# Patient Record
(demographics unavailable — no encounter records)

---

## 2017-05-31 PROBLEM — L29.2 VULVAR ITCHING: Status: ACTIVE | Noted: 2017-05-31

## 2017-05-31 PROCEDURE — 87660 TRICHOMONAS VAGIN DIR PROBE: CPT | Performed by: OBSTETRICS & GYNECOLOGY

## 2017-05-31 PROCEDURE — 87624 HPV HI-RISK TYP POOLED RSLT: CPT | Performed by: OBSTETRICS & GYNECOLOGY

## 2017-05-31 PROCEDURE — 87389 HIV-1 AG W/HIV-1&-2 AB AG IA: CPT | Performed by: OBSTETRICS & GYNECOLOGY

## 2017-05-31 PROCEDURE — 86696 HERPES SIMPLEX TYPE 2 TEST: CPT | Performed by: OBSTETRICS & GYNECOLOGY

## 2017-05-31 PROCEDURE — 87480 CANDIDA DNA DIR PROBE: CPT | Performed by: OBSTETRICS & GYNECOLOGY

## 2017-05-31 PROCEDURE — 86803 HEPATITIS C AB TEST: CPT | Performed by: OBSTETRICS & GYNECOLOGY

## 2017-05-31 PROCEDURE — 87340 HEPATITIS B SURFACE AG IA: CPT | Performed by: OBSTETRICS & GYNECOLOGY

## 2017-05-31 PROCEDURE — 36415 COLL VENOUS BLD VENIPUNCTURE: CPT | Performed by: OBSTETRICS & GYNECOLOGY

## 2017-05-31 PROCEDURE — 87510 GARDNER VAG DNA DIR PROBE: CPT | Performed by: OBSTETRICS & GYNECOLOGY

## 2017-05-31 PROCEDURE — 87591 N.GONORRHOEAE DNA AMP PROB: CPT | Performed by: OBSTETRICS & GYNECOLOGY

## 2017-05-31 PROCEDURE — 88175 CYTOPATH C/V AUTO FLUID REDO: CPT | Performed by: OBSTETRICS & GYNECOLOGY

## 2017-05-31 PROCEDURE — 86695 HERPES SIMPLEX TYPE 1 TEST: CPT | Performed by: OBSTETRICS & GYNECOLOGY

## 2017-05-31 PROCEDURE — 87491 CHLMYD TRACH DNA AMP PROBE: CPT | Performed by: OBSTETRICS & GYNECOLOGY

## 2017-05-31 PROCEDURE — 86780 TREPONEMA PALLIDUM: CPT | Performed by: OBSTETRICS & GYNECOLOGY

## 2018-10-01 PROCEDURE — 86147 CARDIOLIPIN ANTIBODY EA IG: CPT | Performed by: INTERNAL MEDICINE

## 2018-10-01 PROCEDURE — 86200 CCP ANTIBODY: CPT | Performed by: INTERNAL MEDICINE

## 2018-10-01 PROCEDURE — 82955 ASSAY OF G6PD ENZYME: CPT | Performed by: INTERNAL MEDICINE

## 2018-10-01 PROCEDURE — 85705 THROMBOPLASTIN INHIBITION: CPT | Performed by: INTERNAL MEDICINE

## 2018-10-01 PROCEDURE — 85613 RUSSELL VIPER VENOM DILUTED: CPT | Performed by: INTERNAL MEDICINE

## 2018-10-01 PROCEDURE — 80074 ACUTE HEPATITIS PANEL: CPT | Performed by: INTERNAL MEDICINE

## 2018-10-01 PROCEDURE — 85610 PROTHROMBIN TIME: CPT | Performed by: INTERNAL MEDICINE

## 2018-10-01 PROCEDURE — 86376 MICROSOMAL ANTIBODY EACH: CPT | Performed by: INTERNAL MEDICINE

## 2018-10-01 PROCEDURE — 86480 TB TEST CELL IMMUN MEASURE: CPT | Performed by: INTERNAL MEDICINE

## 2018-10-01 PROCEDURE — 86146 BETA-2 GLYCOPROTEIN ANTIBODY: CPT | Performed by: INTERNAL MEDICINE

## 2018-10-01 PROCEDURE — 85732 THROMBOPLASTIN TIME PARTIAL: CPT | Performed by: INTERNAL MEDICINE

## 2018-12-04 PROBLEM — N76.3 CHRONIC VULVITIS: Status: ACTIVE | Noted: 2018-12-04

## 2018-12-04 PROBLEM — F31.9 BIPOLAR DEPRESSION (HCC): Status: ACTIVE | Noted: 2018-12-04

## 2018-12-07 PROCEDURE — 82043 UR ALBUMIN QUANTITATIVE: CPT | Performed by: INTERNAL MEDICINE

## 2018-12-07 PROCEDURE — 87086 URINE CULTURE/COLONY COUNT: CPT | Performed by: INTERNAL MEDICINE

## 2018-12-07 PROCEDURE — 82570 ASSAY OF URINE CREATININE: CPT | Performed by: INTERNAL MEDICINE

## 2018-12-07 PROCEDURE — 81001 URINALYSIS AUTO W/SCOPE: CPT | Performed by: INTERNAL MEDICINE

## 2018-12-11 PROBLEM — M85.88 OSTEOPENIA OF SPINE: Status: ACTIVE | Noted: 2018-12-11

## 2019-01-04 PROCEDURE — 86235 NUCLEAR ANTIGEN ANTIBODY: CPT | Performed by: INTERNAL MEDICINE

## 2019-05-21 PROBLEM — M06.00 SERONEGATIVE RHEUMATOID ARTHRITIS (HCC): Status: ACTIVE | Noted: 2019-05-21

## 2019-05-21 PROBLEM — M35.00 SECONDARY SJOGREN'S SYNDROME (HCC): Status: ACTIVE | Noted: 2019-05-21

## 2021-03-04 PROBLEM — D84.9 IMMUNOCOMPROMISED STATE (HCC): Status: ACTIVE | Noted: 2021-03-04

## 2022-03-03 PROBLEM — E04.1 THYROID NODULE: Status: ACTIVE | Noted: 2022-03-03

## 2022-07-28 NOTE — ED INITIAL ASSESSMENT (HPI)
Pt arrives ambulatory to triage, states she has been having insomnia x 3 days. Pt was at [facility] today but signed herself out. Here with mother and sister, they state she has hx of bipolar, has been hallucinating. Pt denies all of this.

## 2022-07-29 PROBLEM — F25.9 SCHIZOAFFECTIVE DISORDER (HCC): Status: ACTIVE | Noted: 2022-07-29

## 2022-07-29 PROBLEM — F31.5 SEVERE DEPRESSED BIPOLAR I DISORDER WITH PSYCHOTIC FEATURES (HCC): Status: ACTIVE | Noted: 2022-07-29

## 2022-07-29 NOTE — ED INITIAL ASSESSMENT (HPI)
Pt arrived from [facility] shortly after arriving there. Pt did not want to be admitted, was confused, and verbally threatening EMS.

## 2022-07-29 NOTE — ED QUICK NOTES
Gave report to [facility]. CARYN is aware pt was given haldol. Pt to be transferred back.

## 2022-07-29 NOTE — ED PROVIDER NOTES
Patient Seen in: [facility] Emergency Department      History   Patient presents with:  Eval-P    Stated Complaint: Psychosis    Subjective:   HPI    Patient is a 77-year-old female with history of bipolar disorder with psychotic features who had been seen in the emergency department earlier tonight and was admitted to [facility] voluntarily decompensated during transfer becoming more agitated with hallucinations noted and when she arrived at the facility became increasingly more agitated becoming combative with [facility] staff. Brought back to the ER. Patient refusing to talk to me at this time. Continues to look around the room. Objective:   No pertinent past medical history. No pertinent past surgical history. No pertinent social history. Review of Systems    Positive for stated complaint: Psychosis  Other systems are as noted in HPI. Constitutional and vital signs reviewed. All other systems reviewed and negative except as noted above. Physical Exam     ED Triage Vitals   BP    Pulse    Resp    Temp    Temp src    SpO2    O2 Device        Current:[facility] 11/01/2018 (Within Weeks)         Physical Exam    Constitutional: oriented to person, place, and time, appears well-developed and well-nourished. HENT:   Head: Normocephalic and atraumatic. Eyes: No scleral icterus. Neck: Normal range of motion. Neck supple. Cardiovascular: Normal rate, regular rhythm, normal heart sounds and intact distal pulses. Exam reveals no gallop and no friction rub. No murmur heard. Pulmonary/Chest: Effort normal. No respiratory distress. no wheezes. no rales. no tenderness. Abdominal: Soft. Bowel sounds are normal, no distension and no mass. There is no tenderness. There is no rebound and no guarding. Musculoskeletal: Normal range of motion, no edema. Neurological: alert and oriented to person, place, and time. Skin: Skin is warm and dry. No rash noted. Psychiatric: Agitated, will not answer questions. Responding to internal stimuli, actively hallucinating. ED Course   Labs Reviewed - No data to display            MDM      Patient requiring Haldol and Ativan. Medicated. Certificate filled out as the patient has not been sleeping recently and is actively hallucinating and has history of bipolar disorder with psychotic features. At this time patient is unable to care for herself and per family has not been taking her medications. Patient will be transferred to [facility] for further treatment. Disposition and Plan     Clinical Impression:  Bipolar disorder, current episode mixed, severe, with psychotic features ([facility] [address]  (primary encounter diagnosis)  Agitation  Psychosis, unspecified psychosis type [facility])     Disposition:  Psychiatric transfer  7/29/2022  5:49 am    Follow-up:  No follow-up provider specified.         Medications Prescribed:  Current Discharge Medication List

## 2022-08-11 PROBLEM — F31.60: Status: ACTIVE | Noted: 2022-08-11
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
## 2022-08-11 NOTE — ED QUICK NOTES
Patient waiting in hallway for sister to arrive, per [facility] sister had 1 hour to arrive. Attempted to call sister, no answer. Patient continuing to refuse to change clothes and leave although it has been more than 1 hour.

## 2022-08-11 NOTE — ED PROVIDER NOTES
[facility] They did recommend inpatient the patient is not suicidal or homicidal but is baseline psychotic. She is apparently decision-making capacity and does not want to be admitted. [facility] feels patient can go home and follow-up with them.

## 2022-08-11 NOTE — ED QUICK NOTES
PT'S FAMILY CONTINUING TO REFUSE TO PICK PT UP, SPENT LAST HOUR ON PHONE WITH PATIENT ATTEMPTING TO CONVINCE HER TO SIGN IN VOLUNTARY.

## 2022-08-11 NOTE — ED QUICK NOTES
THIS RN SPOKE WITH AZEEM PT'S SISTER, AZEEM REFUSING TO COME PICK PT UP. STATES PT NEEDS TO TALK TO HER DOCTOR. PT'S . DR. Eugenio De Anda IS NOT IN OFFICE. PT'S SISTER ADVISED PT WILL BE DISCHARGED AND IS IN NEED OF A RIDE. REFUSING TO COME PICK HER UP.   MD TAYLOR
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
